# Patient Record
(demographics unavailable — no encounter records)

---

## 2024-10-24 NOTE — ASSESSMENT
[FreeTextEntry1] : This is a 61 year old female with fibroids who presents today to discuss family history of colon cancer. Patient completed screening colonoscopy 12/2023 and was identified to have 2 polyps. She was recommended a 5 year follow up screening interval. States that she has had numerous family members recently diagnosed with colon cancer including mother and 2 uncles. States that other family members have other cancers including lung, pancreatic, and brain. Patient would like to know if she needs EGD to evaluate for gastric cancers. She is from the [facility] originally.   #Family history of colon cancer - Patient does not warrant EGD at this time. No obvious alarm symptoms - Given [facility] history, will evaluate for H. pylori today with breath test - Will refer to genetic counceling  #Screening  for colon cancer - Colonoscopy 12/2023 identified 2 polyps and recommended 5 year interval - Due for colonoscopy 12/2028  Follow up in 12 months  Melchor Felton DO Gastroenterology Fellow [facility], [facility]

## 2024-10-24 NOTE — HISTORY OF PRESENT ILLNESS
[FreeTextEntry1] : This is a 61 year old female with fibroids who presents today to discuss family history of colon cancer.  10/22/2024 Patient completed screening colonoscopy 12/2023 and was identified to have 2 polyps. She was recommended a 5 year follow up screening interval. Since that time, patient has felt very well. Denies abdominal pain, nausea, vomiting, constipation, diarrhea, hematochezia, and melena. Denies dysphagia and odynophagia. Denies unintentional weight loss. States that she has had numerous family members recently diagnosed with colon cancer including mother and 2 uncles. States that other family members have other cancers including lung, pancreatic, and brain. Patient would like to know if she needs EGD to evaluate for gastric cancers. She is from the [facility].   11/14/23 - notices heartburn more often then usual. uses TUMS always. at one point took omeprazole which helps - denies trouble swallowing - mother diagnosed with colon cancer in 2016, stage 3 but doing well now in remission - pt reports memory is declining, seeing neurologist  previous hx: p/f evaluation of 2.5 mo of nausea. No inciting factor or trauma. Sx starts around mid morning and can last until end of the day. Always improve in the morning and sometimes with tums. Around 4-7 times per week. Sx localized to the epigastrum radiating up the chest. Denies postprandial bloating and vomiting. Patient reports long standing dizziness, worse with position. Reports recent cardiac workup for chest pain but has not been told the result.  Denies fever, chill, cp, sob, abd pain, vomiting, dysuria, constipation, melena, hematochezia, unexplained wt loss, dysphagia or odynophagia. No AC or NSAIDs.  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

## 2024-10-24 NOTE — ASSESSMENT
[FreeTextEntry1] : This is a 61 year old female with fibroids who presents today to discuss family history of colon cancer. Patient completed screening colonoscopy 12/2023 and was identified to have 2 polyps. She was recommended a 5 year follow up screening interval. States that she has had numerous family members recently diagnosed with colon cancer including mother and 2 uncles. States that other family members have other cancers including lung, pancreatic, and brain. Patient would like to know if she needs EGD to evaluate for gastric cancers. She is from the [facility] originally.   #Family history of colon cancer - Patient does not warrant EGD at this time. No obvious alarm symptoms - Given [facility] history, will evaluate for H. pylori today with breath test - Will refer to genetic counceling  #Screening  for colon cancer - Colonoscopy 12/2023 identified 2 polyps and recommended 5 year interval - Due for colonoscopy 12/2028  Follow up in 12 months  Melchor Felton DO Gastroenterology Fellow [facility], [facility]

## 2024-10-24 NOTE — END OF VISIT
[] : Fellow [Time Spent: ___ minutes] : I have spent [unfilled] minutes of time on the encounter which excludes teaching and separately reported services. [FreeTextEntry3] : excludes teaching time

## 2024-10-24 NOTE — HISTORY OF PRESENT ILLNESS
[FreeTextEntry1] : This is a 61 year old female with fibroids who presents today to discuss family history of colon cancer.  10/22/2024 Patient completed screening colonoscopy 12/2023 and was identified to have 2 polyps. She was recommended a 5 year follow up screening interval. Since that time, patient has felt very well. Denies abdominal pain, nausea, vomiting, constipation, diarrhea, hematochezia, and melena. Denies dysphagia and odynophagia. Denies unintentional weight loss. States that she has had numerous family members recently diagnosed with colon cancer including mother and 2 uncles. States that other family members have other cancers including lung, pancreatic, and brain. Patient would like to know if she needs EGD to evaluate for gastric cancers. She is from the [facility].   11/14/23 - notices heartburn more often then usual. uses TUMS always. at one point took omeprazole which helps - denies trouble swallowing - mother diagnosed with colon cancer in 2016, stage 3 but doing well now in remission - pt reports memory is declining, seeing neurologist  previous hx: p/f evaluation of 2.5 mo of nausea. No inciting factor or trauma. Sx starts around mid morning and can last until end of the day. Always improve in the morning and sometimes with tums. Around 4-7 times per week. Sx localized to the epigastrum radiating up the chest. Denies postprandial bloating and vomiting. Patient reports long standing dizziness, worse with position. Reports recent cardiac workup for chest pain but has not been told the result.  Denies fever, chill, cp, sob, abd pain, vomiting, dysuria, constipation, melena, hematochezia, unexplained wt loss, dysphagia or odynophagia. No AC or NSAIDs.  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

## 2025-01-07 NOTE — HISTORY OF PRESENT ILLNESS
[de-identified] : 5/21/2024 61F referred by Dr. Gray for right parotid lesion. 1,6cm  Lesion found incidentally on MRI brain workup for memory loss. She denies all related symptoms including dysfunction of facial muscles, pain in the area, palpable neck masses, issues with eating or swallowing, fever, chills, or unintentional weight loss. She presents today for surgical consultation.   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 Recommend tissue sampling.  5/8/2024: FNA Right Parotid, 1.9 cm -NEOPLASTIC, UNCERTAIN MALIGNANT POTENTIAL (KARIS CLASS IVB) -Cellular smears consisting of groups of basaloid cells - see Note -Note: No definitive high-grade features are seen on this material. The differential diagnosis includes low-grade salivary gland neoplasms, benign or malignant.  [FreeTextEntry1] : 9/24/24: Patient s/p right parotidectomy, here today for 3 month follow up. She continues to do well, no new palpable masses or lesions. Overall doing quite well.   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 Does wish to have a consultation for botox with Dr. Yu

## 2025-01-07 NOTE — PHYSICAL EXAM
[de-identified] : Right parotid well healed no masses or lesions, mild sensory disturbances wnl post op [Normal] : mucosa is normal [Midline] : trachea located in midline position

## 2025-05-20 NOTE — PHYSICAL EXAM
[Normal] : mucosa is normal [Midline] : trachea located in midline position [de-identified] : Right parotid well healed no masses or lesions, mild sensory , no neck masses, disturbances but otherwise within normal limits

## 2025-05-20 NOTE — HISTORY OF PRESENT ILLNESS
[de-identified] : 5/21/2024 61F referred by Dr. Gray for right parotid lesion. 1,6cm  Lesion found incidentally on MRI brain workup for memory loss. She denies all related symptoms including dysfunction of facial muscles, pain in the area, palpable neck masses, issues with eating or swallowing, fever, chills, or unintentional weight loss. She presents today for surgical consultation.   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 Recommend tissue sampling.  5/8/2024: FNA Right Parotid, 1.9 cm -NEOPLASTIC, UNCERTAIN MALIGNANT POTENTIAL (KARIS CLASS IVB) -Cellular smears consisting of groups of basaloid cells - see Note -Note: No definitive high-grade features are seen on this material. The differential diagnosis includes low-grade salivary gland neoplasms, benign or malignant.  [FreeTextEntry1] : 9/24/24: Patient s/p right parotidectomy, here today for 3 month follow up. She continues to do well, no new palpable masses or lesions. Overall doing quite well.   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 Does wish to have a consultation for botox with Dr. Yu.  5/20/25: Patient here today for repeat exam and to review US results. No new issues in the right face. She was concerned about her posterior neck and wished to have that area evaluated.

## 2025-05-20 NOTE — DATA REVIEWED
[de-identified] : US reviewed, some small parotid nodularity post surgery but no obvious masses noted.

## 2025-05-20 NOTE — HISTORY OF PRESENT ILLNESS
[de-identified] : 5/21/2024 61F referred by Dr. Gray for right parotid lesion. 1,6cm  Lesion found incidentally on MRI brain workup for memory loss. She denies all related symptoms including dysfunction of facial muscles, pain in the area, palpable neck masses, issues with eating or swallowing, fever, chills, or unintentional weight loss. She presents today for surgical consultation.   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 Recommend tissue sampling.  5/8/2024: FNA Right Parotid, 1.9 cm -NEOPLASTIC, UNCERTAIN MALIGNANT POTENTIAL (KARIS CLASS IVB) -Cellular smears consisting of groups of basaloid cells - see Note -Note: No definitive high-grade features are seen on this material. The differential diagnosis includes low-grade salivary gland neoplasms, benign or malignant.  [FreeTextEntry1] : 9/24/24: Patient s/p right parotidectomy, here today for 3 month follow up. She continues to do well, no new palpable masses or lesions. Overall doing quite well.   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 Does wish to have a consultation for botox with Dr. Yu.  5/20/25: Patient here today for repeat exam and to review US results. No new issues in the right face. She was concerned about her posterior neck and wished to have that area evaluated.

## 2025-05-20 NOTE — PHYSICAL EXAM
[Normal] : mucosa is normal [Midline] : trachea located in midline position [de-identified] : Right parotid well healed no masses or lesions, mild sensory , no neck masses, disturbances but otherwise within normal limits

## 2025-05-20 NOTE — DATA REVIEWED
[de-identified] : US reviewed, some small parotid nodularity post surgery but no obvious masses noted.